# Patient Record
Sex: MALE | Race: WHITE | NOT HISPANIC OR LATINO | Employment: OTHER | URBAN - METROPOLITAN AREA
[De-identification: names, ages, dates, MRNs, and addresses within clinical notes are randomized per-mention and may not be internally consistent; named-entity substitution may affect disease eponyms.]

---

## 2018-08-31 ENCOUNTER — CONSULT (OUTPATIENT)
Dept: URBAN - METROPOLITAN AREA CLINIC 46 | Facility: CLINIC | Age: 53
End: 2018-08-31

## 2018-08-31 DIAGNOSIS — H25.813: ICD-10-CM

## 2018-08-31 DIAGNOSIS — S05.12XS: ICD-10-CM

## 2018-08-31 DIAGNOSIS — H21.552: ICD-10-CM

## 2018-08-31 DIAGNOSIS — H40.051: ICD-10-CM

## 2018-08-31 DIAGNOSIS — D31.32: ICD-10-CM

## 2018-08-31 PROCEDURE — 92132 CPTRZD OPH DX IMG ANT SGM: CPT

## 2018-08-31 PROCEDURE — G8428 CUR MEDS NOT DOCUMENT: HCPCS

## 2018-08-31 PROCEDURE — G8756 NO BP MEASURE DOC: HCPCS

## 2018-08-31 PROCEDURE — 76514 ECHO EXAM OF EYE THICKNESS: CPT

## 2018-08-31 PROCEDURE — 92014 COMPRE OPH EXAM EST PT 1/>: CPT

## 2018-08-31 PROCEDURE — 92250 FUNDUS PHOTOGRAPHY W/I&R: CPT

## 2018-08-31 PROCEDURE — 9222550 BILAT EXTENDED OPHTHALMOSCOPY, FIRST

## 2018-08-31 PROCEDURE — 92020 GONIOSCOPY: CPT

## 2018-08-31 RX ORDER — TIMOLOL MALEATE 6.8 MG/ML
1 SOLUTION OPHTHALMIC EVERY MORNING
Start: 2018-08-31

## 2018-08-31 ASSESSMENT — VISUAL ACUITY
OD_CC: J6+
OD_SC: 20/20-1
OS_CC: J5
OS_SC: 20/30-2

## 2018-08-31 ASSESSMENT — TONOMETRY
OD_IOP_MMHG: 17
OS_IOP_MMHG: 22

## 2018-08-31 ASSESSMENT — PACHYMETRY
OD_CT_UM: 550
OS_CT_UM: 541

## 2018-09-28 ENCOUNTER — IOP CHECK (OUTPATIENT)
Dept: URBAN - METROPOLITAN AREA CLINIC 46 | Facility: CLINIC | Age: 53
End: 2018-09-28

## 2018-09-28 DIAGNOSIS — H21.552: ICD-10-CM

## 2018-09-28 DIAGNOSIS — H40.051: ICD-10-CM

## 2018-09-28 PROCEDURE — G8427 DOCREV CUR MEDS BY ELIG CLIN: HCPCS

## 2018-09-28 PROCEDURE — G8756 NO BP MEASURE DOC: HCPCS

## 2018-09-28 PROCEDURE — 92012 INTRM OPH EXAM EST PATIENT: CPT

## 2018-09-28 ASSESSMENT — VISUAL ACUITY
OD_SC: 20/20
OS_SC: 20/40
OS_PH: 20/30

## 2018-09-28 ASSESSMENT — TONOMETRY
OS_IOP_MMHG: 17
OD_IOP_MMHG: 17

## 2019-02-01 ENCOUNTER — IOP CHECK (OUTPATIENT)
Dept: URBAN - METROPOLITAN AREA CLINIC 46 | Facility: CLINIC | Age: 54
End: 2019-02-01

## 2019-02-01 DIAGNOSIS — H21.552: ICD-10-CM

## 2019-02-01 DIAGNOSIS — H40.051: ICD-10-CM

## 2019-02-01 DIAGNOSIS — H25.813: ICD-10-CM

## 2019-02-01 PROCEDURE — 92012 INTRM OPH EXAM EST PATIENT: CPT

## 2019-02-01 PROCEDURE — 92133 CPTRZD OPH DX IMG PST SGM ON: CPT

## 2019-02-01 ASSESSMENT — VISUAL ACUITY
OD_SC: 20/20-1
OS_SC: 20/30-2

## 2019-02-01 ASSESSMENT — TONOMETRY
OD_IOP_MMHG: 17
OS_IOP_MMHG: 16

## 2019-06-28 ENCOUNTER — IOP CHECK (OUTPATIENT)
Dept: URBAN - METROPOLITAN AREA CLINIC 46 | Facility: CLINIC | Age: 54
End: 2019-06-28

## 2019-06-28 DIAGNOSIS — H40.051: ICD-10-CM

## 2019-06-28 DIAGNOSIS — H21.552: ICD-10-CM

## 2019-06-28 DIAGNOSIS — H25.813: ICD-10-CM

## 2019-06-28 PROCEDURE — 92015 DETERMINE REFRACTIVE STATE: CPT

## 2019-06-28 PROCEDURE — 92012 INTRM OPH EXAM EST PATIENT: CPT

## 2019-06-28 RX ORDER — LATANOPROST 50 UG/ML: 1 SOLUTION/ DROPS OPHTHALMIC EVERY EVENING

## 2019-06-28 ASSESSMENT — TONOMETRY
OD_IOP_MMHG: 14
OS_IOP_MMHG: 19

## 2019-06-28 ASSESSMENT — VISUAL ACUITY
OS_SC: 20/40
OD_SC: 20/30+1

## 2019-11-01 ENCOUNTER — IOP CHECK (OUTPATIENT)
Dept: URBAN - METROPOLITAN AREA CLINIC 46 | Facility: CLINIC | Age: 54
End: 2019-11-01

## 2019-11-01 DIAGNOSIS — H40.051: ICD-10-CM

## 2019-11-01 DIAGNOSIS — H21.552: ICD-10-CM

## 2019-11-01 DIAGNOSIS — S05.12XS: ICD-10-CM

## 2019-11-01 DIAGNOSIS — H25.813: ICD-10-CM

## 2019-11-01 PROCEDURE — 92012 INTRM OPH EXAM EST PATIENT: CPT

## 2019-11-01 PROCEDURE — 92083 EXTENDED VISUAL FIELD XM: CPT

## 2019-11-01 ASSESSMENT — TONOMETRY
OS_IOP_MMHG: 17
OD_IOP_MMHG: 15

## 2019-11-01 ASSESSMENT — VISUAL ACUITY
OS_CC: 20/20
OD_CC: 20/20

## 2020-02-10 NOTE — PROCEDURE NOTE: SURGICAL
<p>Prior to commencing surgery patient identification, surgical procedure, site, and side were confirmed by Dr. Mandie Roper. Following topical proparacaine anesthesia, the patient was positioned at the YAG laser, a contact lens coupled to the cornea with methylcellulose and an axial posterior capsulotomy performed without complication using 2.9 Mj x 27. One drop of Alphagan was instilled and the patient returned to the holding area having tolerated the procedure well and without complication. </p><p>MRN:[id number]</p>

## 2020-08-28 ENCOUNTER — ESTABLISHED COMPREHENSIVE EXAM (OUTPATIENT)
Dept: URBAN - METROPOLITAN AREA CLINIC 46 | Facility: CLINIC | Age: 55
End: 2020-08-28

## 2020-08-28 DIAGNOSIS — H40.051: ICD-10-CM

## 2020-08-28 DIAGNOSIS — H21.552: ICD-10-CM

## 2020-08-28 PROCEDURE — 92014 COMPRE OPH EXAM EST PT 1/>: CPT

## 2020-08-28 PROCEDURE — 92250 FUNDUS PHOTOGRAPHY W/I&R: CPT

## 2020-08-28 ASSESSMENT — VISUAL ACUITY
OS_CC: 20/25
OD_CC: 20/25

## 2020-08-28 ASSESSMENT — TONOMETRY
OD_IOP_MMHG: 16
OS_IOP_MMHG: 17

## 2021-01-29 ENCOUNTER — DILATED FUNDUS EXAM (OUTPATIENT)
Dept: URBAN - METROPOLITAN AREA CLINIC 46 | Facility: CLINIC | Age: 56
End: 2021-01-29

## 2021-01-29 DIAGNOSIS — H40.051: ICD-10-CM

## 2021-01-29 DIAGNOSIS — S05.12XS: ICD-10-CM

## 2021-01-29 DIAGNOSIS — H25.813: ICD-10-CM

## 2021-01-29 DIAGNOSIS — H21.552: ICD-10-CM

## 2021-01-29 PROCEDURE — 92202 OPSCPY EXTND ON/MAC DRAW: CPT

## 2021-01-29 PROCEDURE — 92133 CPTRZD OPH DX IMG PST SGM ON: CPT

## 2021-01-29 PROCEDURE — 92012 INTRM OPH EXAM EST PATIENT: CPT

## 2021-01-29 ASSESSMENT — VISUAL ACUITY
OS_CC: 20/25
OD_CC: 20/20-1

## 2021-01-29 ASSESSMENT — TONOMETRY
OD_IOP_MMHG: 17
OS_IOP_MMHG: 15

## 2021-08-13 ENCOUNTER — IOP CHECK (OUTPATIENT)
Dept: URBAN - METROPOLITAN AREA CLINIC 46 | Facility: CLINIC | Age: 56
End: 2021-08-13

## 2021-08-13 DIAGNOSIS — H40.051: ICD-10-CM

## 2021-08-13 DIAGNOSIS — H21.552: ICD-10-CM

## 2021-08-13 PROCEDURE — 92012 INTRM OPH EXAM EST PATIENT: CPT

## 2021-08-13 ASSESSMENT — VISUAL ACUITY
OD_CC: 20/25
OS_CC: 20/25

## 2021-08-13 ASSESSMENT — TONOMETRY
OD_IOP_MMHG: 14
OS_IOP_MMHG: 14

## 2022-01-14 ENCOUNTER — FOLLOW UP (OUTPATIENT)
Dept: URBAN - METROPOLITAN AREA CLINIC 46 | Facility: CLINIC | Age: 57
End: 2022-01-14

## 2022-01-14 DIAGNOSIS — H21.552: ICD-10-CM

## 2022-01-14 DIAGNOSIS — H40.051: ICD-10-CM

## 2022-01-14 DIAGNOSIS — D31.32: ICD-10-CM

## 2022-01-14 PROCEDURE — 92012 INTRM OPH EXAM EST PATIENT: CPT

## 2022-01-14 PROCEDURE — 92083 EXTENDED VISUAL FIELD XM: CPT

## 2022-01-14 ASSESSMENT — TONOMETRY
OD_IOP_MMHG: 16
OS_IOP_MMHG: 16

## 2022-01-14 ASSESSMENT — VISUAL ACUITY
OS_CC: 20/30-1
OD_CC: 20/30

## 2022-08-19 ENCOUNTER — COMPREHENSIVE EXAM (OUTPATIENT)
Dept: URBAN - METROPOLITAN AREA CLINIC 46 | Facility: CLINIC | Age: 57
End: 2022-08-19

## 2022-08-19 DIAGNOSIS — H21.552: ICD-10-CM

## 2022-08-19 DIAGNOSIS — H40.051: ICD-10-CM

## 2022-08-19 DIAGNOSIS — H25.813: ICD-10-CM

## 2022-08-19 PROCEDURE — 92012 INTRM OPH EXAM EST PATIENT: CPT

## 2022-08-19 PROCEDURE — 92202 OPSCPY EXTND ON/MAC DRAW: CPT

## 2022-08-19 PROCEDURE — 92015 DETERMINE REFRACTIVE STATE: CPT

## 2022-08-19 ASSESSMENT — VISUAL ACUITY
OU_CC: J3
OD_CC: 20/30
OD_CC: J5
OS_CC: 20/40+1
OU_CC: 20/30
OS_CC: J5

## 2022-08-19 ASSESSMENT — TONOMETRY
OS_IOP_MMHG: 16
OD_IOP_MMHG: 17

## 2023-08-04 ENCOUNTER — FOLLOW UP (OUTPATIENT)
Dept: URBAN - METROPOLITAN AREA CLINIC 46 | Facility: CLINIC | Age: 58
End: 2023-08-04

## 2023-08-04 DIAGNOSIS — H40.051: ICD-10-CM

## 2023-08-04 DIAGNOSIS — H21.552: ICD-10-CM

## 2023-08-04 DIAGNOSIS — D31.32: ICD-10-CM

## 2023-08-04 PROCEDURE — 92012 INTRM OPH EXAM EST PATIENT: CPT

## 2023-08-04 PROCEDURE — 92083 EXTENDED VISUAL FIELD XM: CPT

## 2023-08-04 ASSESSMENT — TONOMETRY
OD_IOP_MMHG: 16
OS_IOP_MMHG: 16

## 2023-08-04 ASSESSMENT — VISUAL ACUITY
OD_CC: 20/25
OS_CC: 20/20

## 2024-03-29 ENCOUNTER — FOLLOW UP (OUTPATIENT)
Dept: URBAN - METROPOLITAN AREA CLINIC 46 | Facility: CLINIC | Age: 59
End: 2024-03-29

## 2024-03-29 DIAGNOSIS — H21.552: ICD-10-CM

## 2024-03-29 DIAGNOSIS — H40.051: ICD-10-CM

## 2024-03-29 PROCEDURE — 92202 OPSCPY EXTND ON/MAC DRAW: CPT

## 2024-03-29 PROCEDURE — 92012 INTRM OPH EXAM EST PATIENT: CPT

## 2024-03-29 PROCEDURE — 92133 CPTRZD OPH DX IMG PST SGM ON: CPT

## 2024-03-29 ASSESSMENT — VISUAL ACUITY
OD_CC: 20/30-2
OS_CC: 20/30

## 2024-03-29 ASSESSMENT — TONOMETRY
OS_IOP_MMHG: 19
OD_IOP_MMHG: 18